# Patient Record
Sex: MALE | Race: WHITE
[De-identification: names, ages, dates, MRNs, and addresses within clinical notes are randomized per-mention and may not be internally consistent; named-entity substitution may affect disease eponyms.]

---

## 2019-04-29 ENCOUNTER — HOSPITAL ENCOUNTER (EMERGENCY)
Dept: HOSPITAL 43 - DL.ED | Age: 64
Discharge: HOME | End: 2019-04-29
Payer: COMMERCIAL

## 2019-04-29 DIAGNOSIS — F02.81: ICD-10-CM

## 2019-04-29 DIAGNOSIS — Z79.899: ICD-10-CM

## 2019-04-29 DIAGNOSIS — G30.9: ICD-10-CM

## 2019-04-29 DIAGNOSIS — R56.9: Primary | ICD-10-CM

## 2019-04-29 LAB
ANION GAP SERPL CALC-SCNC: 17.3 MMOL/L
CHLORIDE SERPL-SCNC: 103 MMOL/L (ref 101–111)
SODIUM SERPL-SCNC: 137 MMOL/L (ref 135–145)

## 2019-04-29 PROCEDURE — 80053 COMPREHEN METABOLIC PANEL: CPT

## 2019-04-29 PROCEDURE — 99285 EMERGENCY DEPT VISIT HI MDM: CPT

## 2019-04-29 PROCEDURE — 36415 COLL VENOUS BLD VENIPUNCTURE: CPT

## 2019-04-29 PROCEDURE — 85025 COMPLETE CBC W/AUTO DIFF WBC: CPT

## 2019-04-29 NOTE — EDM.PDOC
ED HPI GENERAL MEDICAL PROBLEM





- General


Chief Complaint: Neurological Problem


Stated Complaint: AMBULANCE


Time Seen by Provider: 04/29/19 08:32


Source of Information: Reports: EMS, EMS Notes Reviewed, Family, RN, RN Notes 

Reviewed


History Limitations: Reports: Altered Mental Status, Combative/Threatening





- History of Present Illness


INITIAL COMMENTS - FREE TEXT/NARRATIVE: 


Pt to ER per [facility] with c/o seizure. Wife present shortly after ambulance 

arrival. She states the patient has severely progressed dementia/alzheimers. 

States she takes care of him at home, staff in the home at times. Has become 

more agitated and combative recently. Today was up, had his medications, began 

to eat breakfast and had what she reports as a seizure. Denies any history of 

seizures in the past. Denies any recent illness. Wife has paper documentation 

of DNR/DNI. Wife requests no CT of head be done, no invasive testing, other 

than labs. 


Family reported lethargy and "post ictal", EMS also reported that upon arrival 

to the home. Upon arrival to the ER patient is alert, combative. 


Onset: Today, Sudden





- Related Data


 Allergies











Allergy/AdvReac Type Severity Reaction Status Date / Time


 


No Known Allergies Allergy   Verified 04/29/19 08:50











Home Meds: 


 Home Meds





Escitalopram Oxalate 20 mg PO DAILY 04/29/19 [History]


Multivitamin [Multi-Day Vitamins] 1 tab PO DAILY 04/29/19 [History]


QUEtiapine [SEROquel] 12.5 mg PO BID 04/29/19 [History]











ED ROS GENERAL





- Review of Systems


Review Of Systems: ROS reveals no pertinent complaints other than HPI.





- Physical Exam


Exam: See Below


Exam Limited By: Combative/Threatening


General Appearance: Alert, Anxious, Moderate Distress


Eye Exam: Bilateral Eye: EOMI, Normal Inspection


Ears: Normal External Exam, Hearing Grossly Normal


Nose: Normal Inspection


Throat/Mouth: Normal Inspection, Normal Voice, No Airway Compromise


Head Exam: Atraumatic, Normocephalic


Neck: Normal Inspection, Supple, Non-Tender, Full Range of Motion


Respiratory/Chest: No Respiratory Distress, Decreased Breath Sounds, Crackles (

throughout)


Cardiovascular: Normal Peripheral Pulses, Regular Rate, Rhythm, No Edema, No 

Gallop, No JVD, No Murmur, No Rub


GI/Abdominal: Normal Bowel Sounds, Soft, Non-Tender, No Organomegaly, No 

Distention


 (Male) Exam: Deferred


Rectal (Males) Exam: Deferred


Neuro Exam (Abbreviated): Alert, Confused, Disoriented, Other (demential/

alzheimers)


Back Exam: Normal Inspection, Full Range of Motion


Extremities: Normal Inspection, Normal Range of Motion, Non-Tender, No Pedal 

Edema, Normal Capillary Refill


Psychiatric: Anxious


Skin Exam: Warm, Dry, Intact, Normal Color, No Rash





Course





- Vital Signs


Last Recorded V/S: 


 Last Vital Signs











Temp  95.9 F   04/29/19 08:31


 


Pulse  88   04/29/19 08:31


 


Resp  22 H  04/29/19 08:31


 


BP      


 


Pulse Ox  95   04/29/19 08:31














- Orders/Labs/Meds


Orders: 


 Active Orders 24 hr











 Category Date Time Status


 


 Chest 1V Frontal [CR] Stat Exams  04/29/19 09:17 Stop Req


 


 Sodium Chloride 0.9% [Normal Saline] 1,000 ml Med  04/29/19 10:02 Active





 IV .BOLUS   








 Medication Orders





Sodium Chloride (Normal Saline)  1,000 mls @ 999 mls/hr IV .BOLUS ONE


   Stop: 04/29/19 11:02








Labs: 


 Laboratory Tests











  04/29/19 04/29/19 Range/Units





  08:54 08:54 


 


WBC  8.9   (5.0-10.0)  10^3/uL


 


RBC  5.21   (4.6-6.2)  10^6/uL


 


Hgb  15.8   (14.0-18.0)  g/dL


 


Hct  47.3   (40.0-54.0)  %


 


MCV  90.8   ()  fL


 


MCH  30.3   (27.0-34.0)  pg


 


MCHC  33.4   (33.0-35.0)  g/dL


 


Plt Count  204   (150-450)  10^3/uL


 


Neut % (Auto)  68.2   (42.2-75.2)  %


 


Lymph % (Auto)  21.6   (20.5-50.1)  %


 


Mono % (Auto)  8.9 H   (2-8)  %


 


Eos % (Auto)  1.1   (1.0-3.0)  %


 


Baso % (Auto)  0.2   (0.0-1.0)  %


 


Sodium   137  (135-145)  mmol/L


 


Potassium   4.3  (3.6-5.0)  mmol/L


 


Chloride   103  (101-111)  mmol/L


 


Carbon Dioxide   21.0  (21.0-31.0)  mmol/L


 


Anion Gap   17.3  


 


BUN   22 H  (7-18)  mg/dL


 


Creatinine   1.4 H  (0.6-1.3)  mg/dL


 


Est Cr Clr Drug Dosing   58.66  mL/min


 


Estimated GFR (MDRD)   51  


 


BUN/Creatinine Ratio   15.71  


 


Glucose   165 H  ()  mg/dL


 


Calcium   9.1  (8.4-10.2)  mg/dl


 


Total Bilirubin   1.2 H  (0.2-1.0)  mg/dL


 


AST   26  (10-42)  IU/L


 


ALT   19  (10-60)  IU/L


 


Alkaline Phosphatase   64  ()  IU/L


 


Total Protein   6.8  (6.7-8.2)  g/dl


 


Albumin   4.1  (3.2-5.5)  g/dl


 


Globulin   2.7  


 


Albumin/Globulin Ratio   1.52  











Meds: 


Medications











Generic Name Dose Route Start Last Admin





  Trade Name Freq  PRN Reason Stop Dose Admin


 


Sodium Chloride  1,000 mls @ 999 mls/hr  04/29/19 10:02  





  Normal Saline  IV  04/29/19 11:02  





  .BOLUS ONE   





     





     





     





     














Discontinued Medications














Generic Name Dose Route Start Last Admin





  Trade Name Freq  PRN Reason Stop Dose Admin


 


Acetaminophen  650 mg  04/29/19 09:28  04/29/19 09:32





  Tylenol  PO  04/29/19 09:29  650 mg





  NOW ONE   Administration





     





     





     





     














- Radiology Interpretation


Free Text/Narrative:: 


Chest xray:








See rad report








- Re-Assessments/Exams


Free Text/Narrative Re-Assessment/Exam: 





04/29/19 09:25


Discussed the possibility of aspiration pneumonia with the wife. She states he 

did have food in his mouth when the event began. We discussed the likelihood of 

seeing an aspiration pneumonia this early is unlikely. She agrees and 

understands. She states she is comfortable watching him at home for signs of 

pneumonia or illness and taking him to the clinic for an xray if necessary. 

Patient is very agitated at this time and an xray may be difficult. Patient is 

rubbing his head and moaning, which wife states is not normal. Wife also states 

he has not had any urinary symptoms. She states she would not like him cathed 

for the urine due to his already severe agitation. She will monitor and again 

take him to the clinic if necessary. We will monitor his labs today for 

infection, electrolyte imbalance, etc. If all is WNL, the patient will be 

discharged home in the wife's care and she will monitor. Wife agrees with this 

plan and states understanding. 








Departure





- Departure


Time of Disposition: 10:25


Disposition: Home, Self-Care 01


Condition: Fair


Clinical Impression: 


 Witnessed seizure-like activity





Alzheimer's dementia


Qualifiers:


 Alzheimer's disease onset: unspecified onset Dementia behavioral disturbance: 

with behavioral disturbance Qualified Code(s): G30.9 - Alzheimer's disease, 

unspecified; F02.81 - Dementia in other diseases classified elsewhere with 

behavioral disturbance








- Discharge Information


*PRESCRIPTION DRUG MONITORING PROGRAM REVIEWED*: No


*COPY OF PRESCRIPTION DRUG MONITORING REPORT IN PATIENT DEVORA: No


Instructions:  Seizure, Adult, Easy-to-Read


Forms:  ED Department Discharge


Additional Instructions: 


Monitor for signs of aspiration pneumonia, urinary tract infection, further 

seizure activity


Encourage fluids


Follow up with your primary care facility








- My Orders


Last 24 Hours: 


My Active Orders





04/29/19 09:17


Chest 1V Frontal [CR] Stat 





04/29/19 10:02


Sodium Chloride 0.9% [Normal Saline] 1,000 ml IV .BOLUS 














- Assessment/Plan


Last 24 Hours: 


My Active Orders





04/29/19 09:17


Chest 1V Frontal [CR] Stat 





04/29/19 10:02


Sodium Chloride 0.9% [Normal Saline] 1,000 ml IV .BOLUS